# Patient Record
(demographics unavailable — no encounter records)

---

## 2025-02-24 NOTE — HISTORY OF PRESENT ILLNESS
[FreeTextEntry1] : rash  [de-identified] : rash on back for past 3 weeks was slightly red now brown asymptomatic had similar rash on upper neck that resolved on its own

## 2025-02-24 NOTE — ASSESSMENT
[FreeTextEntry1] : Hyperpigmented/hyperkeratotic papules on back  New diagnosis,  uncertain clinical course Differential includes retention hyperkeratosis vs less likely terra firme forme dermatosis (not wiping with alcohol) trial of amlactin reassess in 6-8 weeks